# Patient Record
Sex: FEMALE | Race: BLACK OR AFRICAN AMERICAN | NOT HISPANIC OR LATINO | Employment: STUDENT | URBAN - METROPOLITAN AREA
[De-identification: names, ages, dates, MRNs, and addresses within clinical notes are randomized per-mention and may not be internally consistent; named-entity substitution may affect disease eponyms.]

---
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
## 2022-12-26 ENCOUNTER — HOSPITAL ENCOUNTER (EMERGENCY)
Facility: HOSPITAL | Age: 22
Discharge: HOME | End: 2022-12-26
Attending: EMERGENCY MEDICINE
Payer: MEDICAID

## 2022-12-26 VITALS
TEMPERATURE: 100 F | WEIGHT: 154.31 LBS | HEART RATE: 110 BPM | SYSTOLIC BLOOD PRESSURE: 121 MMHG | BODY MASS INDEX: 26.34 KG/M2 | HEIGHT: 64 IN | DIASTOLIC BLOOD PRESSURE: 81 MMHG | RESPIRATION RATE: 20 BRPM | OXYGEN SATURATION: 99 %

## 2022-12-26 DIAGNOSIS — J10.1 INFLUENZA A: Primary | ICD-10-CM

## 2022-12-26 LAB
B-HCG UR QL: NEGATIVE
CTP QC/QA: YES
MOLECULAR STREP A: NEGATIVE
POC MOLECULAR INFLUENZA A AGN: POSITIVE
POC MOLECULAR INFLUENZA B AGN: NEGATIVE
SARS-COV-2 RDRP RESP QL NAA+PROBE: NEGATIVE

## 2022-12-26 PROCEDURE — 81025 URINE PREGNANCY TEST: CPT | Performed by: EMERGENCY MEDICINE

## 2022-12-26 PROCEDURE — 87651 STREP A DNA AMP PROBE: CPT

## 2022-12-26 PROCEDURE — 25000003 PHARM REV CODE 250

## 2022-12-26 PROCEDURE — 87502 INFLUENZA DNA AMP PROBE: CPT

## 2022-12-26 PROCEDURE — 99284 EMERGENCY DEPT VISIT MOD MDM: CPT

## 2022-12-26 PROCEDURE — 87635 SARS-COV-2 COVID-19 AMP PRB: CPT | Performed by: EMERGENCY MEDICINE

## 2022-12-26 RX ORDER — BENZONATATE 100 MG/1
100 CAPSULE ORAL 3 TIMES DAILY PRN
Qty: 30 CAPSULE | Refills: 0 | Status: SHIPPED | OUTPATIENT
Start: 2022-12-26

## 2022-12-26 RX ORDER — IBUPROFEN 600 MG/1
600 TABLET ORAL EVERY 6 HOURS PRN
Qty: 20 TABLET | Refills: 0 | Status: SHIPPED | OUTPATIENT
Start: 2022-12-26

## 2022-12-26 RX ORDER — ACETAMINOPHEN 500 MG
500 TABLET ORAL EVERY 6 HOURS PRN
Qty: 20 TABLET | Refills: 0 | Status: SHIPPED | OUTPATIENT
Start: 2022-12-26

## 2022-12-26 RX ORDER — ACETAMINOPHEN 500 MG
500 TABLET ORAL
Status: COMPLETED | OUTPATIENT
Start: 2022-12-26 | End: 2022-12-26

## 2022-12-26 RX ORDER — CETIRIZINE HYDROCHLORIDE 10 MG/1
10 TABLET ORAL DAILY PRN
Qty: 30 TABLET | Refills: 0 | Status: SHIPPED | OUTPATIENT
Start: 2022-12-26

## 2022-12-26 RX ORDER — PROMETHAZINE HYDROCHLORIDE AND DEXTROMETHORPHAN HYDROBROMIDE 6.25; 15 MG/5ML; MG/5ML
5 SYRUP ORAL EVERY 4 HOURS PRN
Qty: 118 ML | Refills: 0 | Status: SHIPPED | OUTPATIENT
Start: 2022-12-26

## 2022-12-26 RX ORDER — FLUTICASONE PROPIONATE 50 MCG
1 SPRAY, SUSPENSION (ML) NASAL 2 TIMES DAILY PRN
Qty: 15 G | Refills: 0 | Status: SHIPPED | OUTPATIENT
Start: 2022-12-26

## 2022-12-26 RX ORDER — ONDANSETRON 4 MG/1
4 TABLET, ORALLY DISINTEGRATING ORAL EVERY 6 HOURS PRN
Qty: 15 TABLET | Refills: 0 | Status: SHIPPED | OUTPATIENT
Start: 2022-12-26

## 2022-12-26 RX ADMIN — ACETAMINOPHEN 500 MG: 500 TABLET ORAL at 10:12

## 2022-12-26 NOTE — Clinical Note
"Suzan "Suzan" Vianney was seen and treated in our emergency department on 12/26/2022.  She may return to work on 12/31/2022.  Patient is Flu positive     If you have any questions or concerns, please don't hesitate to call.      Alayna Holdsworth, PA-C"

## 2022-12-26 NOTE — Clinical Note
"Reidrline "Suzan" Vianney was seen and treated in our emergency department on 12/26/2022.  She may return to work on 12/30/2022.       If you have any questions or concerns, please don't hesitate to call.      Alayna Holdsworth, PA-C"

## 2022-12-26 NOTE — Clinical Note
"Reidrline "Suzan" Vianney was seen and treated in our emergency department on 12/26/2022.  She may return to work on 12/31/2022.       If you have any questions or concerns, please don't hesitate to call.      Alayna Holdsworth, PA-C"

## 2022-12-27 NOTE — ED TRIAGE NOTES
21 yo presents to ED with c/o fever, vomiting , and body aches x 2 days. Pt denies pain and/or any other symptoms, or PMH.

## 2022-12-27 NOTE — ED PROVIDER NOTES
Encounter Date: 12/26/2022       History     Chief Complaint   Patient presents with    COVID-19 Concerns     Pt comes to the er via pov with cough, runny nose, congestion, fever, body aches, and loss of taste and smell.      22-year-old female with no past medical history presents to the ED for cough.  Patient states this been going on for 2 days.  She has not taken anything to make this better.  Patient denies any sick contacts or recent travel.  Patient admits to subjective fever, congestion, runny nose, sore throat, dry cough, pleuritic chest pain with coughing, nausea, vomiting x2, body aches, and headache.  Patient denies sweats, chills, trouble swallowing, shortness of breath, chest pain, abdominal pain, diarrhea, constipation, urinary symptoms, dizziness, lightheadedness, and rashes.    Review of patient's allergies indicates:  No Known Allergies  No past medical history on file.  No past surgical history on file.  No family history on file.  Social History     Tobacco Use    Smoking status: Never    Smokeless tobacco: Never   Substance Use Topics    Alcohol use: No    Drug use: No     Review of Systems   Constitutional:  Positive for fever (Subjective). Negative for chills and diaphoresis.   HENT:  Positive for congestion, rhinorrhea and sore throat. Negative for ear pain, trouble swallowing and voice change.    Eyes:  Negative for visual disturbance.   Respiratory:  Positive for cough (dry). Negative for shortness of breath.         (+) pleuritic chest pain with coughing   Cardiovascular:  Negative for chest pain.   Gastrointestinal:  Positive for nausea and vomiting (x2). Negative for abdominal pain, constipation and diarrhea.   Genitourinary:  Negative for decreased urine volume, difficulty urinating, dysuria and frequency.   Musculoskeletal:  Positive for myalgias (body aches). Negative for arthralgias.   Skin:  Negative for rash.   Neurological:  Positive for headaches. Negative for dizziness and  light-headedness.     Physical Exam     Initial Vitals [12/26/22 2121]   BP Pulse Resp Temp SpO2   121/81 110 20 (!) 100.5 °F (38.1 °C) 99 %      MAP       --         Physical Exam    Nursing note and vitals reviewed.  Constitutional: She appears well-developed and well-nourished. She is not diaphoretic. She is active. She does not appear ill. No distress.   HENT:   Head: Normocephalic and atraumatic.   Right Ear: External ear normal.   Left Ear: External ear normal.   Nose: Nose normal.   Mouth/Throat: Uvula is midline and mucous membranes are normal. Posterior oropharyngeal edema and posterior oropharyngeal erythema present. No oropharyngeal exudate or tonsillar abscesses.   2+ bilateral tonsils; no exudate   Eyes: Conjunctivae, EOM and lids are normal. Pupils are equal, round, and reactive to light. Right eye exhibits no discharge. Left eye exhibits no discharge.   Neck: Neck supple.   Normal range of motion.   Full passive range of motion without pain.     Cardiovascular:  Normal rate and regular rhythm.           Pulmonary/Chest: Effort normal and breath sounds normal. No respiratory distress.   Abdominal: Abdomen is soft. She exhibits no distension. There is no abdominal tenderness.   Musculoskeletal:         General: Normal range of motion.      Cervical back: Full passive range of motion without pain, normal range of motion and neck supple.     Neurological: She is alert and oriented to person, place, and time. GCS eye subscore is 4. GCS verbal subscore is 5. GCS motor subscore is 6.   Skin: Skin is dry. Capillary refill takes less than 2 seconds.       ED Course   Procedures  Labs Reviewed   POCT INFLUENZA A/B MOLECULAR - Abnormal; Notable for the following components:       Result Value    POC Molecular Influenza A Ag Positive (*)     All other components within normal limits   SARS-COV-2 RDRP GENE   POCT URINE PREGNANCY   POCT STREP A MOLECULAR          Imaging Results    None          Medications    acetaminophen tablet 500 mg (500 mg Oral Given 12/26/22 2201)     Medical Decision Making:   Initial Assessment:   22-year-old female with no past medical history presents to the ED for cough.  Patient's chart and medical history reviewed.  Differential Diagnosis:   COVID  Flu  Strep throat  Viral URI  Clinical Tests:   Lab Tests: Ordered and Reviewed  ED Management:  Patient's vitals reviewed.  She is febrile, no respiratory distress, nontoxic-appearing in the ED. Patient had an erythematous throat with 2+ bilateral tonsils with no exudates noted. Patient given tylenol for her fever.  Patient denied nausea medication.  UPT negative. Patient is covid and strep negative. Patient is influenza A positive.  Recheck temp shows it is now normal range.  Patient's O2 sat is 99% on room air with no respiratory distress and bilateral normal breath sounds. Discussed with patient she will need to quarantine until she is afebrile for 24 hours without taking any medications that would lower her temperature or any new symptoms developing. Discussed with patient she can use Tylenol and ibuprofen as needed for fevers and headaches, as well as staying hydrated and resting until this clears from her system.  I will send in high dose Motrin, Tylenol, Flonase, promethazine DM cough syrup, Zyrtec, Tessalon Perles, and benzocaine throat lozenges for symptomatic control..  Discussed with patient strict return precautions, she verbalized understanding.  Patient agrees with this plan.  Patient is stable for discharge.                          Clinical Impression:   Final diagnoses:  [J10.1] Influenza A (Primary)        ED Disposition Condition    Discharge Stable          ED Prescriptions       Medication Sig Dispense Start Date End Date Auth. Provider    ibuprofen (ADVIL,MOTRIN) 600 MG tablet Take 1 tablet (600 mg total) by mouth every 6 (six) hours as needed for Pain. 20 tablet 12/26/2022 -- Alayna Holdsworth, PA-C    acetaminophen  (TYLENOL) 500 MG tablet Take 1 tablet (500 mg total) by mouth every 6 (six) hours as needed for Temperature greater than or Pain. 20 tablet 12/26/2022 -- Alayna Holdsworth, PA-C    fluticasone propionate (FLONASE) 50 mcg/actuation nasal spray 1 spray (50 mcg total) by Each Nostril route 2 (two) times daily as needed for Rhinitis or Allergies. 15 g 12/26/2022 -- Alayna Holdsworth, PA-C    cetirizine (ZYRTEC) 10 MG tablet Take 1 tablet (10 mg total) by mouth daily as needed for Allergies or Rhinitis. 30 tablet 12/26/2022 -- Alayna Holdsworth, PA-C    benzocaine-menthoL 6-10 mg lozenge Take 1 lozenge by mouth every 2 (two) hours as needed for Pain (sore throat). 18 tablet 12/26/2022 -- Alayna Holdsworth, PA-C    benzonatate (TESSALON) 100 MG capsule Take 1 capsule (100 mg total) by mouth 3 (three) times daily as needed for Cough. 30 capsule 12/26/2022 -- Alayna Holdsworth, PA-C    promethazine-dextromethorphan (PROMETHAZINE-DM) 6.25-15 mg/5 mL Syrp Take 5 mLs by mouth every 4 (four) hours as needed (cough/congestion). 118 mL 12/26/2022 -- Alayna Holdsworth, PA-C    ondansetron (ZOFRAN-ODT) 4 MG TbDL Take 1 tablet (4 mg total) by mouth every 6 (six) hours as needed (Nausea). 15 tablet 12/26/2022 -- Alayna Holdsworth, PA-C          Follow-up Information       Follow up With Specialties Details Why Contact Info    Radha Tucker MD Pediatrics   [address] [place] [address] [place] [address] [phone]               Alayna Holdsworth, PA-C  12/26/22 0583

## 2022-12-27 NOTE — DISCHARGE INSTRUCTIONS
Thank you for coming to our Emergency Department today. It is important to remember that some problems are difficult to diagnose and may not be found during your first visit. Be sure to follow up with your primary care doctor and review any labs/imaging that was performed with them. If you do not have a primary care doctor, you may contact the one listed on your discharge paperwork or you may also call the [facility] Appointment Desk at [phone] to schedule an appointment with one.     All medications may potentially have side effects and it is impossible to predict which medications may give you side effects. If you feel that you are having a negative effect of any medication you should immediately stop taking them and seek medical attention.    Return to the ER with any questions/concerns, new/concerning symptoms, worsening or failure to improve. Do not drive or make any important decisions for 24 hours if you have received any pain medications, sedatives or mood altering drugs during your ER visit.

## 2022-12-27 NOTE — FIRST PROVIDER EVALUATION
Medical screening examination initiated.  I have conducted a focused provider triage encounter, findings are as follows:    Brief history of present illness:  headache, nasal congestion, sore throat , n/v, body aches, fever, loss of taste and taste    There were no vitals filed for this visit.    Pertinent physical exam:      There were no vitals filed for this visit.    Pt is well appearing, sitting up in no distress  HEADt: normocephalic, atraumatic  Eyes: EOMI, PERRL, ANICTERIC  Chest: normal chest expansion, no respiratory distress  CV: normal rate  Abd: non distended  Ext: no edema  Psych: linear goal directed thinking, no si/hi  Neuro: no tremor    Brief workup plan:  covid test    Preliminary workup initiated; this workup will be continued and followed by the physician or advanced practice provider that is assigned to the patient when roomed.

## 2023-04-12 ENCOUNTER — HOSPITAL ENCOUNTER (EMERGENCY)
Facility: HOSPITAL | Age: 23
Discharge: HOME | End: 2023-04-12
Attending: EMERGENCY MEDICINE
Payer: MEDICAID

## 2023-04-12 VITALS
RESPIRATION RATE: 20 BRPM | WEIGHT: 151 LBS | HEIGHT: 64 IN | TEMPERATURE: 98 F | SYSTOLIC BLOOD PRESSURE: 127 MMHG | OXYGEN SATURATION: 100 % | HEART RATE: 86 BPM | DIASTOLIC BLOOD PRESSURE: 69 MMHG | BODY MASS INDEX: 25.78 KG/M2

## 2023-04-12 DIAGNOSIS — N93.9 VAGINAL BLEEDING: Primary | ICD-10-CM

## 2023-04-12 LAB
ABO + RH BLD: NORMAL
ALBUMIN SERPL BCP-MCNC: 4.1 G/DL (ref 3.5–5.2)
ALP SERPL-CCNC: 80 U/L (ref 55–135)
ALT SERPL W/O P-5'-P-CCNC: 50 U/L (ref 10–44)
ANION GAP SERPL CALC-SCNC: 8 MMOL/L (ref 8–16)
APTT PPP: 25.4 SEC (ref 21–32)
AST SERPL-CCNC: 200 U/L (ref 10–40)
B-HCG UR QL: NEGATIVE
BASOPHILS # BLD AUTO: 0.03 K/UL (ref 0–0.2)
BASOPHILS NFR BLD: 0.5 % (ref 0–1.9)
BILIRUB SERPL-MCNC: 0.4 MG/DL (ref 0.1–1)
BILIRUB UR QL STRIP: NEGATIVE
BLD GP AB SCN CELLS X3 SERPL QL: NORMAL
BUN SERPL-MCNC: 10 MG/DL (ref 6–20)
CALCIUM SERPL-MCNC: 9.3 MG/DL (ref 8.7–10.5)
CHLORIDE SERPL-SCNC: 104 MMOL/L (ref 95–110)
CLARITY UR: CLEAR
CO2 SERPL-SCNC: 27 MMOL/L (ref 23–29)
COLOR UR: YELLOW
CREAT SERPL-MCNC: 0.7 MG/DL (ref 0.5–1.4)
CTP QC/QA: YES
DIFFERENTIAL METHOD: ABNORMAL
EOSINOPHIL # BLD AUTO: 0.1 K/UL (ref 0–0.5)
EOSINOPHIL NFR BLD: 1.6 % (ref 0–8)
ERYTHROCYTE [DISTWIDTH] IN BLOOD BY AUTOMATED COUNT: 14.1 % (ref 11.5–14.5)
EST. GFR  (NO RACE VARIABLE): >60 ML/MIN/1.73 M^2
GLUCOSE SERPL-MCNC: 81 MG/DL (ref 70–110)
GLUCOSE UR QL STRIP: NEGATIVE
HCT VFR BLD AUTO: 36.1 % (ref 37–48.5)
HGB BLD-MCNC: 11.4 G/DL (ref 12–16)
HGB UR QL STRIP: ABNORMAL
IMM GRANULOCYTES # BLD AUTO: 0.02 K/UL (ref 0–0.04)
IMM GRANULOCYTES NFR BLD AUTO: 0.4 % (ref 0–0.5)
INR PPP: 1.1 (ref 0.8–1.2)
KETONES UR QL STRIP: NEGATIVE
LEUKOCYTE ESTERASE UR QL STRIP: NEGATIVE
LYMPHOCYTES # BLD AUTO: 1.8 K/UL (ref 1–4.8)
LYMPHOCYTES NFR BLD: 32.3 % (ref 18–48)
MCH RBC QN AUTO: 27.7 PG (ref 27–31)
MCHC RBC AUTO-ENTMCNC: 31.6 G/DL (ref 32–36)
MCV RBC AUTO: 88 FL (ref 82–98)
MICROSCOPIC COMMENT: ABNORMAL
MONOCYTES # BLD AUTO: 0.5 K/UL (ref 0.3–1)
MONOCYTES NFR BLD: 9.9 % (ref 4–15)
NEUTROPHILS # BLD AUTO: 3 K/UL (ref 1.8–7.7)
NEUTROPHILS NFR BLD: 55.3 % (ref 38–73)
NITRITE UR QL STRIP: NEGATIVE
NRBC BLD-RTO: 0 /100 WBC
PH UR STRIP: 8 [PH] (ref 5–8)
PLATELET # BLD AUTO: 222 K/UL (ref 150–450)
PMV BLD AUTO: 10.9 FL (ref 9.2–12.9)
POTASSIUM SERPL-SCNC: 3.8 MMOL/L (ref 3.5–5.1)
PROT SERPL-MCNC: 7.8 G/DL (ref 6–8.4)
PROT UR QL STRIP: NEGATIVE
PROTHROMBIN TIME: 11.4 SEC (ref 9–12.5)
RBC # BLD AUTO: 4.11 M/UL (ref 4–5.4)
RBC #/AREA URNS HPF: >100 /HPF (ref 0–4)
SODIUM SERPL-SCNC: 139 MMOL/L (ref 136–145)
SP GR UR STRIP: 1.01 (ref 1–1.03)
SPECIMEN OUTDATE: NORMAL
URN SPEC COLLECT METH UR: ABNORMAL
UROBILINOGEN UR STRIP-ACNC: NEGATIVE EU/DL
WBC # BLD AUTO: 5.48 K/UL (ref 3.9–12.7)
WBC #/AREA URNS HPF: 2 /HPF (ref 0–5)

## 2023-04-12 PROCEDURE — 85730 THROMBOPLASTIN TIME PARTIAL: CPT | Performed by: EMERGENCY MEDICINE

## 2023-04-12 PROCEDURE — 81025 URINE PREGNANCY TEST: CPT | Performed by: EMERGENCY MEDICINE

## 2023-04-12 PROCEDURE — 80053 COMPREHEN METABOLIC PANEL: CPT | Performed by: EMERGENCY MEDICINE

## 2023-04-12 PROCEDURE — 25000003 PHARM REV CODE 250

## 2023-04-12 PROCEDURE — 86900 BLOOD TYPING SEROLOGIC ABO: CPT

## 2023-04-12 PROCEDURE — 99284 EMERGENCY DEPT VISIT MOD MDM: CPT | Mod: 25

## 2023-04-12 PROCEDURE — 85025 COMPLETE CBC W/AUTO DIFF WBC: CPT | Performed by: EMERGENCY MEDICINE

## 2023-04-12 PROCEDURE — 96360 HYDRATION IV INFUSION INIT: CPT

## 2023-04-12 PROCEDURE — 96361 HYDRATE IV INFUSION ADD-ON: CPT

## 2023-04-12 PROCEDURE — 81000 URINALYSIS NONAUTO W/SCOPE: CPT | Performed by: EMERGENCY MEDICINE

## 2023-04-12 PROCEDURE — 85610 PROTHROMBIN TIME: CPT | Performed by: EMERGENCY MEDICINE

## 2023-04-12 RX ADMIN — SODIUM CHLORIDE 1000 ML: 9 INJECTION, SOLUTION INTRAVENOUS at 07:04

## 2023-04-12 NOTE — ED PROVIDER NOTES
"Encounter Date: 4/12/2023       History     Chief Complaint   Patient presents with    Vaginal Bleeding     Pt reports to the ED with C/O vaginal bleeding x 6 days. Pt reports "I am on my period but its never been this heavy." Pt reports "its dark red clots and I am using a new pad every 2 hours." Pt denies any complaints at this time. Pt placed in QT bed for eval.      HPI  Review of patient's allergies indicates:  No Known Allergies  No past medical history on file.  No past surgical history on file.  No family history on file.  Social History     Tobacco Use    Smoking status: Never    Smokeless tobacco: Never   Substance Use Topics    Alcohol use: No    Drug use: No     Review of Systems    Physical Exam     Initial Vitals [04/12/23 1745]   BP Pulse Resp Temp SpO2   104/60 80 16 98.5 °F (36.9 °C) 100 %      MAP       --         Physical Exam    ED Course   Procedures  Labs Reviewed   URINALYSIS, REFLEX TO URINE CULTURE   CBC W/ AUTO DIFFERENTIAL   COMPREHENSIVE METABOLIC PANEL   POCT URINE PREGNANCY          Imaging Results    None          Medications - No data to display                           Clinical Impression:    ***Please document a Clinical Impression and click the "Refresh" button to refresh your note and automatically pull in before signing.***         "

## 2023-04-12 NOTE — ED TRIAGE NOTES
"Pt presents to the ED with complaint of vaginal bleeding x 6 days accompanied with dark red clots   Pt reports "I am on my period but its never been this heavy andnd I am using a new pad every 2 hours "   Pt denies any complaints at this time.   Pt AAOX4  "

## 2023-04-12 NOTE — ED PROVIDER NOTES
"Encounter Date: 4/12/2023       History     Chief Complaint   Patient presents with    Vaginal Bleeding     Pt reports to the ED with C/O vaginal bleeding x 6 days. Pt reports "I am on my period but its never been this heavy." Pt reports "its dark red clots and I am using a new pad every 2 hours." Pt denies any complaints at this time. Pt placed in QT bed for eval.      24 y/o F with no past medical history presents today for evaluation of heavy menstrual bleeding x6 days. Pt reports large clots and that she has had to change her pad every 2hrs. LMP was about a month ago. Her cycles are normally regular with light bleeding. Pt is sexually active with a male who has had a vasectomy and they do not use protection. She denies f/chills/n/v/d, vaginal discharge, abdominal pain, dysuria.     The history is provided by the patient. No  was used.   Review of patient's allergies indicates:  No Known Allergies  History reviewed. No pertinent past medical history.  History reviewed. No pertinent surgical history.  History reviewed. No pertinent family history.  Social History     Tobacco Use    Smoking status: Never    Smokeless tobacco: Never   Substance Use Topics    Alcohol use: No    Drug use: No     Review of Systems   Constitutional:  Negative for chills and fever.   HENT:  Negative for sore throat.    Respiratory:  Negative for shortness of breath.    Cardiovascular:  Negative for chest pain.   Gastrointestinal:  Negative for abdominal pain, constipation, diarrhea, nausea and vomiting.   Genitourinary:  Positive for menstrual problem and vaginal bleeding. Negative for difficulty urinating, dysuria, flank pain, pelvic pain, vaginal discharge and vaginal pain.   Musculoskeletal:  Negative for back pain.   Skin:  Negative for rash.   Neurological:  Negative for weakness and headaches.   Hematological:  Does not bruise/bleed easily.     Physical Exam     Initial Vitals [04/12/23 1745]   BP Pulse Resp " Temp SpO2   104/60 80 16 98.5 °F (36.9 °C) 100 %      MAP       --         Physical Exam    Constitutional: She appears well-developed and well-nourished. She is not diaphoretic. No distress.   HENT:   Head: Normocephalic and atraumatic.   Right Ear: External ear normal.   Left Ear: External ear normal.   Neck:   Normal range of motion.  Cardiovascular:  Normal rate and normal heart sounds.           Pulmonary/Chest: Breath sounds normal. No respiratory distress.   Genitourinary:    Pelvic exam was performed with patient supine.   There is no rash, tenderness, lesion or injury on the right labia. There is no rash, tenderness, lesion or injury on the left labia. Cervix exhibits no discharge.    Vaginal bleeding present.      No vaginal discharge or tenderness.   There is bleeding in the vagina. No tenderness in the vagina.    No signs of injury in the vagina.      Genitourinary Comments: Cervical os closed     Musculoskeletal:         General: Normal range of motion.      Cervical back: Normal range of motion.     Neurological: She is alert.   Skin: Skin is warm and dry.   Psychiatric: She has a normal mood and affect. Her behavior is normal. Judgment and thought content normal.       ED Course   Procedures  Labs Reviewed   URINALYSIS, REFLEX TO URINE CULTURE - Abnormal; Notable for the following components:       Result Value    Occult Blood UA 3+ (*)     All other components within normal limits    Narrative:     Specimen Source->Urine   CBC W/ AUTO DIFFERENTIAL - Abnormal; Notable for the following components:    Hemoglobin 11.4 (*)     Hematocrit 36.1 (*)     MCHC 31.6 (*)     All other components within normal limits   COMPREHENSIVE METABOLIC PANEL - Abnormal; Notable for the following components:     (*)     ALT 50 (*)     All other components within normal limits   URINALYSIS MICROSCOPIC - Abnormal; Notable for the following components:    RBC, UA >100 (*)     All other components within normal limits     Narrative:     Specimen Source->Urine   APTT   PROTIME-INR   POCT URINE PREGNANCY   TYPE & SCREEN          Imaging Results              US Pelvis Complete Non OB (Final result)  Result time 23 21:12:12   Procedure changed from US Transvaginal Non OB     Final result by Maria Luisa Veras MD (23 21:12:12)                   Impression:      Limited examination.  No uterine masses.  Small to moderate free pelvic fluid.      Electronically signed by: Maria Luisa Veras  Date:    2023  Time:    21:12               Narrative:    EXAMINATION:  ULTRASOUND OF THE PELVIS    CLINICAL HISTORY:  Pain.    TECHNIQUE:  Real-time ultrasound of the pelvis was performed transabdominally.  Patient refused the transvaginal approach.    COMPARISON:  None.    FINDINGS:  The uterus measures 6.4 x 3.8 x 3.9 cm. There are no uterine masses. The endometrial stripe measures 8.6 mm.    The right ovary measures 2.6 x 2.5 x 2 cm.  Vascular flow is seen in the right ovary.    The left ovary measures 2.3 x 1.6 x 1.3 cm.  Vascular flow is seen in the left ovary.    There is small to moderate free fluid in the pelvis.                                       Medications   sodium chloride 0.9% bolus 1,000 mL 1,000 mL (0 mLs Intravenous Stopped 23)     Medical Decision Making:   Initial Assessment:   22 y/o  F with no past medical history presents today for evaluation of heavy menstrual bleeding x6 days with passage of large clots requiring her to change her pad every 2hrs. She is sexually active with a male who has had a vasectomy; they do not use protection. Her cycles are typically regular with light bleeding. She denies fever, chills, n/v/d, dysuria, vaginal discharge, dizziness, lightheadedness, and LOC. On exam she is non-toxic appearing and in no apparent distress. There is no abdominal or pelvic pain to palpation and no CVA tenderness. Pelvic exam reveals vaginal bleeding with a closed cervical os. There is no  vaginal discharge.   Differential Diagnosis:   This is likely dysfunctional uterine bleeding.   Ectopic pregnancy, threatened, incomplete, missed, septic, and complete  considered but urine pregnancy test negative.   Leiomyoma and ovarian cyst considered but not visualized on US.     ED Management:  Urine pregnancy negative.   CBC reveals Hgb of 11.4.   US findings: The uterus measures 6.4 x 3.8 x 3.9 cm. There are no uterine masses. The endometrial stripe measures 8.6 mm. The right ovary measures 2.6 x 2.5 x 2 cm.  Vascular flow is seen in the right ovary. The left ovary measures 2.3 x 1.6 x 1.3 cm.  Vascular flow is seen in the left ovary. There is small to moderate free fluid in the pelvis.  Discussed importance of establishing gynecologic care. Encouraged follow up and provided phone number in paperwork for OBGYN. Discussed return precautions including but not limited to dizziness, lightheadedness, n/v, LOC and worsening bleeding.                         Clinical Impression:   Final diagnoses:  [N93.9] Vaginal bleeding (Primary)        ED Disposition Condition    Discharge Stable          ED Prescriptions    None       Follow-up Information       Follow up With Specialties Details Why Contact Info    Lucius Henry MD Obstetrics and Gynecology, Obstetrics and Gynecology Call in 1 week Schedule an appointment to [facility]. [address]  [address]  [facility] [id number] [phone]               Bony Lainez PA-C  [phone]

## 2023-04-12 NOTE — FIRST PROVIDER EVALUATION
"Medical screening examination initiated.  I have conducted a focused provider triage encounter, findings are as follows:    Brief history of present illness:  24 yo with 6 days of vaginal bleeding, heavy, soaking one pad in 2 hours. No chest pain, sob, lightheadedness or dizziness. Normal cycle is about 4 days and light. This is the normal time of cycle but heavier, longer but denies abd cramping.     Vitals:    04/12/23 1745   BP: 104/60   BP Location: Right arm   Patient Position: Sitting   Pulse: 80   Resp: 16   Temp: 98.5 °F (36.9 °C)   TempSrc: Oral   SpO2: 100%   Weight: 68.5 kg (151 lb)   Height: 5' 4" (1.626 m)       Pertinent physical exam:  calm. Normal gait. Lucid and linear. NAD.     Brief workup plan:   I have introduced myself and initiated work up but have not assumed care. Pt understands that there is a wait for a bed in the ED at this time and once a bed is available, she will be moved to the back and cared for by a provider and nursing care team.      Preliminary workup initiated; this workup will be continued and followed by the physician or advanced practice provider that is assigned to the patient when roomed.  "

## 2023-04-13 NOTE — DISCHARGE INSTRUCTIONS
Thank you for coming to our Emergency Department today. It is important to remember that some problems are difficult to diagnose and may not be found during your first visit. Be sure to follow up with your primary care doctor and review any labs/imaging that was performed with them. If you do not have a primary care doctor, you may contact the one listed on your discharge paperwork or you may also call the [facility] Appointment Desk at [phone] to schedule an appointment with one.     All medications may potentially have side effects and it is impossible to predict which medications may give you side effects. If you feel that you are having a negative effect of any medication you should immediately stop taking them and seek medical attention.    Return to the ER with any questions/concerns, new/concerning symptoms, worsening or failure to improve. Do not drive or make any important decisions for 24 hours if you have received any pain medications, sedatives or mood altering drugs during your ER visit.

## 2023-04-20 NOTE — PROVIDER PROGRESS NOTES - EMERGENCY DEPT.
Encounter Date: 4/12/2023    ED Physician Progress Notes            I have reviewed the case with my JOSE and agree with the history, review of systems, physical exam, assessment, and plan of care as documented. I have also physically saw the patient and performed an independent HPI and exam. In short, the patient presented 2/2 vaginal bleeding and nonspecific symptom.  Labs reassuring.  Ultrasound does not show anything emergent.  Discharged with outpatient    Ulysses Huffamn